# Patient Record
Sex: MALE | URBAN - METROPOLITAN AREA
[De-identification: names, ages, dates, MRNs, and addresses within clinical notes are randomized per-mention and may not be internally consistent; named-entity substitution may affect disease eponyms.]

---
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
## 2021-09-13 NOTE — HPI: PIMPLES (ACNE)
Is This A New Presentation, Or A Follow-Up?: Acne
How Severe Is Your Acne?: mild
[TextBox_4] : Was seen by cardiologist due to swelling in the legs; diagnosed with heart failure\par \par COPD medication is helping, however, is still SOB when walking \par \par

## 2023-09-26 NOTE — PROCEDURE: TREATMENT REGIMEN
Addended by: Donna Niño on: 9/26/2023 05:28 PM     Modules accepted: Orders
Initiate Treatment: Tretinoin .01% cream QHS, Benzaclin gel, and Panoxyl wash
Samples Given: cetaphil and cerave wash
Plan: Pt to f/u in 2 months for further evaluation
Detail Level: Zone